# Patient Record
(demographics unavailable — no encounter records)

---

## 2024-12-11 NOTE — DISCUSSION/SUMMARY
[FreeTextEntry1] : 66 YEAR OLD FEMALE LMP  PRESENTS FOR WELL WOMAN GYNECOLOGIC EXAMINATION AND PAP. LAST SEEN FOR WELL WOMAN VISIT 2023; NO NEW MEDICAL OR SURGICAL HISTORY SINCE LAST VISIT. PT WITH HISTORY OF HYSTERECTOMY AT AGE 50. NEW HISTORY OF HAVING FALLEN AT WORK 2024 AND WENT TO E.D. AND CT SCAN OF THE HEAD WAS DONE AND WAS OK. MECIATIONS; SIMVASTATIN                         LOSARTAN MEDICATION ALLERGIES; NONE ROS;BMSNORMAL; _ BROTHER WITH LIVER CANCER-  NOW           STILL WITH SOME STRESS URINARY INCONTINENCE-UNCHANGED           C/O INTERMITTENT VAGINAL ITCHING AND ITCHING BELOW BREAST DOES BREAST SELF EXAMINATION AT TIMES             MAMMOGRRAPH DONE 2024 BIRADS I             SISTER WITH BREAST CANCER +EXERCISE; + MULTIVITAMINS; + DAIRY; NO TOBACCO OR VAPING FRACTURE HISTORY;NONE NO FAM HISTORY OF OSTEOPOROSIS LAST BONE DENSITY TESTING DONE 2022 WITH SLGIHT LOSS AT LS WITH T -1.1,                         OSTEOPENIA  PE;/70 TEMP 96.5L WEIGHT 250 LBS HEIGHT 5'3"      BREASTS; NO MASSES OR DISCHARGES      ABDOMEN;SONFT GUDELIA MASSES ; NO TENDERNESS TO PALPATION      PELVIC NORMAL EXTERNAL GENIATALIA                    NORMAL URETHREAL MEATUS                    NOMRMAL LABIA MAJORA AND LABIA MINORA ECEPT FOR MULTIPLE SMALL                              SUBACEOUS CYST FROM 3-5 MM                     NORMAL VAGINA                    1S DEGREE CYSTOCELE                   NORMAL VAGINAL VAULT                   NO PALPABLE ADNEXAL MASSES                    IMP; WELL WOMAN          MENOPAUSE          SLIGHT STRESS URINARY INCONTINENCE          MILD  OSEOPENIA          SEBACOUS CYSTS OF LABIA          CYSOCELE          OBESITY  [PLAN; THIN PREP PAP WTH HR HPV  TESTING DONE-PT ADVISED TO CALL IN 2 WKS -RESULTS             BERAST SELF EXAMINATION MONTHLY CONTINUED TO BE STRONGLY ADVISED           ANNUAL MAMMOGRPAHY ADVISED AND REFERRAL SCRIPT GIVEN FOR 2025            E PRESCRIBED LOTRISONE CREAM FOR RASH BENEATH BERASTS AND IN PELVIC AREA            RETURN TO OFFICE ONE YEAR OR PRN

## 2025-03-18 NOTE — ASSESSMENT
[FreeTextEntry1] : 65yo F presents as a follow up, post fall in May 2024 with a right parietal sulcus SAH that resolved prior to hospital discharge. She endorses today that she has been experiencing left-sided head pressure and intermittent headaches. Her PCP suggested that she follow-up with our office. No recent imaging and so she will complete updated CT head noncontrast and return to our office once completed to review the results and delineate an appropriate neurosurgical plan of care. In addition, she has been referred to outpatient PT for her lumbar spine, superior endplate fracture of L1 on CT from 1/30/2025.  No concerns were identified during the visit and we thank her for allowing us to be a part of her care team.   Gene Cole MD  Department of Neurosurgery [facility]  Colleen Ocampo MS, FNP-BC Nurse Practitioner [facility] - [facility]

## 2025-03-18 NOTE — HISTORY OF PRESENT ILLNESS
[de-identified] : Ms. MELARA is a 66-year-old female presenting post fall last May, SAH that showed resolution on last CT but presents today with new s/s.   5/30/24 AM: right parietal sulcus, possible SAH 5/30/24 AFTERNOON: No acute intracranial pathology. The previously observed trace hyperdensity in the right parietal sulcus is no longer visualized  Patient presents today stating that she recently presented to her PCP, Dr. Palma, with complaints of intermittent headaches and left-sided head pressure. Denies seizures, visual disturbances, one-sided weakness. She is ambulating without any assistive devices. PCP advised she return to our office. We discussed today that she will complete an updated CT scan to ensure that there is no subacute on chronic bleed or changes. She will return to review testing and to devise a continued treatment plan moving forward once CT is done, aware that she can contact us sooner if needed.  In addition, CT abd/pelvis appreciated L1 superior endplate fracture 1/30/2025. We are going to refer Ms. MELARA to outpatient PT, no further imaging as she defers discussion of a surgical intervention and is neurologically intact at this time.  Physical Exam: Constitutional: Well appearing, no distress HEENT: Normocephalic Atraumatic Psychiatric: Awake, alert, oriented to person, place, situation and time. Normal affect. Follows commands. Language: Speech is clear, fluent with good repetition & comprehension. No dysarthria. Pulmonary: No respiratory distress   Neurologic: CN II-XII intact; EOMI with no nystagmus. No facial asymmetry bilaterally, full eye closure strength bilaterally. Hearing grossly normal. Head turning & shoulder shrug intact, bilaterally. Tongue midline, normal movements, no atrophy. Smile symmetrical. Palpation: No pain to palpation Strength: Full strength in all major muscle groups Sensation: Full sensation to light touch in all extremities, V1-V3 sensation bilaterally intact. Motor: No pronator drift, muscle strength of bilateral UE and bilateral LE: 5/5. Normal muscle tone. Coordination: No dysmetria on finger-to-nose and heel-to-shin bilaterally Reflexes: DTR of biceps, knee normal 2+ biceps 2+ patellar   No Clonus No Jackson's  ROM intact   Gait: No postural instability. Normal stance and walking without assistance.

## 2025-05-06 NOTE — ASSESSMENT
[FreeTextEntry1] : 67yo F presents for a follow up, prior fall and SAH no longer present on delayed CT head. She is overall doing well. No concerns. No further imaging to be done, no surgical intervention warranted. She will follow up as needed, aware that she can call at any time.   Gene Cole MD  Department of Neurosurgery [facility] / [facility]  Colleen Ocampo MS, FNP-BC Nurse Practitioner [facility]

## 2025-05-06 NOTE — HISTORY OF PRESENT ILLNESS
[FreeTextEntry1] : Ms. MELARA is a 66-year-old female presenting to the office today, last seen in March, history of a fall and SAH.   Presents today, doing well. Denies any recent headaches. Only concern is black floaters in right eye for one week after starting allergy eyedrops, advised to see PCP/ophthalmologist. Otherwise no other s/s. States that her BP is managed well. No recent falls.   IMAGIN/3/25 CT head: no intracranial hemorrhage, acute territorial infarct, or midline shift  PLAN: Ms. MELARA was reassured that there is no bleeding noted or residual SAH. No warrant for further scans or intervention. She will return on an as needed basis going forward, aware that she can call at any time.   Physical Exam: Constitutional: Well appearing, no distress HEENT: Normocephalic Atraumatic Psychiatric: Awake, alert, oriented to person, place, situation and time. Normal affect. Follows commands. Language: Speech is clear, fluent with good repetition & comprehension. No dysarthria. Pulmonary: No respiratory distress  Neurologic: CN II-XII intact; EOMI with no nystagmus. No facial asymmetry bilaterally, full eye closure strength bilaterally. Hearing grossly normal. Head turning & shoulder shrug intact, bilaterally. Tongue midline, normal movements, no atrophy. Smile symmetrical. Palpation: No pain to palpation Strength: Full strength in all major muscle groups Sensation: Full sensation to light touch in all extremities, V1-V3 sensation bilaterally intact. Motor: No pronator drift, muscle strength of bilateral UE and bilateral LE: 5/5. Normal muscle tone. Coordination: No dysmetria on finger-to-nose and heel-to-shin bilaterally Reflexes: DTR of biceps, knee normal 2+ biceps 2+ patellar  No Clonus No Jackson's  ROM intact  Gait: No postural instability. Normal stance and walking without assistance.

## 2025-07-14 NOTE — HISTORY OF PRESENT ILLNESS
[FreeTextEntry1] : 66 year para 3 ( x3) presents with complaints of a ball coming out of the vagina that makes it uncomfortable to walk. Reports splinting to urinate. Denies pain.  Pelvic organ prolapse: + bulge, + pressure/heaviness, duration 4 months  Stress urinary incontinence: 1-2 x/week yes? prior incontinence procedures  Overactive bladder syndrome: daily frequency 10+x/day, 5-6 x/night, + urgency,  20+ x/week UUI episodes, 2 pads/day     Bladder irritants include coffee,    Prior OAB meds no  Voiding dysfunction: + Incomplete bladder emptying, no hesitancy  Lower urinary tract/vaginal symptoms: no UTIs per year, no hematuria, no dysuria, no bladder pain  7 BM/week   no constipation   Fecal incontinence no  Sexually active no (due to vaginal dryness)   Pelvic pain +   Vaginal dryness +   LMP age 50   PMB no  PMSH: PADDY BMI 38 - actively trying to lose weight  PMSH: [id number] - Dr. Wing ([facility]): TLH/BSO (for fibroids)

## 2025-07-14 NOTE — ASSESSMENT
[FreeTextEntry1] : Vaginal vault prolapse - The patient was counseled regarding the possible natural progression of prolapse and the clinical consequences of worsening prolapse. The stage and the location of the prolapse was reviewed with the patient. She was counseled regarding the management strategies including observation, pelvic floor physical therapy, pessary placement and surgery. Patient is interested in surgery, however she is actively trying to lose weight and will return for further discussion about surgery in 3 months.   Urinary urgency, frequency, nocturia and UUI - Will discuss OAB tx options at the next visit.

## 2025-07-14 NOTE — PHYSICAL EXAM
[MA] : MA [FreeTextEntry2] : Rose [FreeTextEntry1] : Void: 50 cc  PVR: 5 cc  Urethra was prepped in sterile fashion and then a sterile 14F catheter was used by me to drain the bladder for her symptoms of Urgency. Patient tolerated the procedure well  Well healed incision: laparoscopic  GH: 6.5  PB: 4 TVL: 8.5 C: -3 D: N/A  Aa: 0 Ba: 0 Ap: +3 Bp: +3  -empty cough stress test  +atrophy  -urethral caruncle  -vestibular tenderness  +prolapse  +urethral hypermobility  -pelvic floor dysfunction  -urethral tenderness  -bladder tenderness  surgically absent cervix  surgically absent uterus (unable to palpate)  adnexa nonpalpable  good sphincter tone  enterocele  good rectal squeeze  intact sacral nerves  1/5 Henrry

## 2025-07-14 NOTE — COUNSELING
[FreeTextEntry1] : Please return to see Dr. Khan in about 3 months. You can always call to be seen earlier if you would like to proceed with surgery sooner.

## 2025-07-14 NOTE — HISTORY OF PRESENT ILLNESS
[FreeTextEntry1] : 66 year para 3 ( x3) presents with complaints of a ball coming out of the vagina that makes it uncomfortable to walk. Reports splinting to urinate. Denies pain.  Pelvic organ prolapse: + bulge, + pressure/heaviness, duration 4 months  Stress urinary incontinence: 1-2 x/week yes? prior incontinence procedures  Overactive bladder syndrome: daily frequency 10+x/day, 5-6 x/night, + urgency,  20+ x/week UUI episodes, 2 pads/day     Bladder irritants include coffee,    Prior OAB meds no  Voiding dysfunction: + Incomplete bladder emptying, no hesitancy  Lower urinary tract/vaginal symptoms: no UTIs per year, no hematuria, no dysuria, no bladder pain  7 BM/week   no constipation   Fecal incontinence no  Sexually active no (due to vaginal dryness)   Pelvic pain +   Vaginal dryness +   LMP age 50   PMB no  PMSH: PADDY BMI 38 - actively trying to lose weight  PMSH: [id number] - Dr. Wing ([place]): TLH/BSO (for fibroids)